# Patient Record
(demographics unavailable — no encounter records)

---

## 2024-12-10 NOTE — REVIEW OF SYSTEMS
Bedside shift change report given to Suni RN (oncoming nurse) by Oralia Livingston RN (offgoing nurse). Report included the following information SBAR, Kardex, Intake/Output, MAR, Recent Results and Cardiac Rhythm SR HR 70. [NI] : Genitourinary  [Nl] : Endocrine [Frequent URIs] : frequent upper respiratory infections [Wheezing] : wheezing [Cough] : cough

## 2024-12-11 NOTE — DATA REVIEWED
[FreeTextEntry1] : I personally reviewed chart documentation - images (pertinent findings included into my note), including: - Dated 2/6/2024 from Dr. Erik Quiñones. - No images to review.

## 2024-12-11 NOTE — HISTORY OF PRESENT ILLNESS
[FreeTextEntry1] : HENRIK is a 3 year old with mild persistent asthma, allergies +Tree +grass +DM +Cats/dogs and food allergies (+cashew / walnut / pecan). ENT: recurrent AOM, previously seen by ENT, no ear tubes.  CLINIC VISIT 2024 - Medications: Budesonide 0.25 mg 1 vial BID -increased recently due to ongoing cough. Zyrtec PRN -not used. - ENT evaluation for recurrent AOMs -not seen in over 1 year. No ear infections recently. - Recent symptoms: cough x 2 months. PCP 1.5 months ago evaluated, lung exam nl. - Recent Albuterol: no, used x 1 seemed to have helped cough. - Recent Oral steroids or ER visits: no.  VISIT 2024 - Started Budesonide 0.25, using consistently (good technique). - Currently, doing well without wheezing and with now resolved nighttime cough. - Allergy skin test +Tree +grass +DM +Cats/dogs. Recommend Zyrtec PRN. - OCS: none since early .  INITIAL HISTORY  Wheezing with recent viral infections. ER visit recently Dec 2023 - sent by PMD after Nebulization. Nighttime cough reported frequently.  RESPIRATORY HISTORY - Symptoms with colds / exertion: - ER visits: x 1 in Dec 2023 Nebulized and Oral steroids. - Hospitalizations: no - ENT-related issues (snoring / AOM): +recurrent AOM. No snoring. - Allergies: no environmental allergies. - Oral steroids: x 2 OCS in lifetime. - ASTHMA risk factors: +PGM has asthma. No eczema (sometimes red on face). - Covid info: infection x 1  -mild cold. Not vaccinated.   - Exposures (smoke, pets): hamster at home. - Delayed vaccinations: no, Flu shot received -unsure. - Birth info: normal  course.

## 2024-12-11 NOTE — CONSULT LETTER
[Dear  ___] : Dear  [unfilled], [Consult Letter:] : I had the pleasure of evaluating your patient, [unfilled]. [Please see my note below.] : Please see my note below. [Consult Closing:] : Thank you very much for allowing me to participate in the care of this patient.  If you have any questions, please do not hesitate to contact me. [Sincerely,] : Sincerely, [FreeTextEntry3] : Abdelrahman Herrera MD Attending Physician, Division of Pediatric Pulmonology.

## 2024-12-11 NOTE — PHYSICAL EXAM
[Well Nourished] : well nourished [Well Developed] : well developed [Alert] : ~L alert [Active] : active [Normal Breathing Pattern] : normal breathing pattern [No Respiratory Distress] : no respiratory distress [No Allergic Shiners] : no allergic shiners [No Drainage] : no drainage [No Conjunctivitis] : no conjunctivitis [No Oral Pallor] : no oral pallor [No Oral Cyanosis] : no oral cyanosis [No Postnasal Drip] : no postnasal drip [No Stridor] : no stridor [Absence Of Retractions] : absence of retractions [Symmetric] : symmetric [Good Expansion] : good expansion [No Acc Muscle Use] : no accessory muscle use [Equal Breath Sounds] : equal breath sounds bilaterally [No Crackles] : no crackles [No Rhonchi] : no rhonchi [No Wheezing] : no wheezing [Normal Sinus Rhythm] : normal sinus rhythm [No Heart Murmur] : no heart murmur [Soft, Non-Tender] : soft, non-tender [No Hepatosplenomegaly] : no hepatosplenomegaly [Non Distended] : was not ~L distended [Abdomen Mass (___ Cm)] : no abdominal mass palpated [Full ROM] : full range of motion [No Clubbing] : no clubbing [Capillary Refill < 2 secs] : capillary refill less than two seconds [No Cyanosis] : no cyanosis [No Petechiae] : no petechiae [No Kyphoscoliosis] : no kyphoscoliosis [No Contractures] : no contractures [Alert and  Oriented] : alert and oriented [No Abnormal Focal Findings] : no abnormal focal findings [Normal Muscle Tone And Reflexes] : normal muscle tone and reflexes [No Birth Marks] : no birth marks [No Rashes] : no rashes [No Skin Lesions] : no skin lesions [Tympanic Membranes Clear] : tympanic membranes were clear [FreeTextEntry3] : +dull TM bilateral. [de-identified] : +shotty anterior cervical line LNs (>Right side). [FreeTextEntry7] : +reduced air exchange throughout, borderline wheezing left lung.

## 2024-12-11 NOTE — PHYSICAL EXAM
[Well Nourished] : well nourished [Well Developed] : well developed [Alert] : ~L alert [Active] : active [Normal Breathing Pattern] : normal breathing pattern [No Respiratory Distress] : no respiratory distress [No Allergic Shiners] : no allergic shiners [No Drainage] : no drainage [No Conjunctivitis] : no conjunctivitis [No Oral Pallor] : no oral pallor [No Oral Cyanosis] : no oral cyanosis [No Postnasal Drip] : no postnasal drip [No Stridor] : no stridor [Absence Of Retractions] : absence of retractions [Symmetric] : symmetric [Good Expansion] : good expansion [No Acc Muscle Use] : no accessory muscle use [Equal Breath Sounds] : equal breath sounds bilaterally [No Crackles] : no crackles [No Rhonchi] : no rhonchi [No Wheezing] : no wheezing [Normal Sinus Rhythm] : normal sinus rhythm [No Heart Murmur] : no heart murmur [Soft, Non-Tender] : soft, non-tender [No Hepatosplenomegaly] : no hepatosplenomegaly [Non Distended] : was not ~L distended [Abdomen Mass (___ Cm)] : no abdominal mass palpated [Full ROM] : full range of motion [No Clubbing] : no clubbing [Capillary Refill < 2 secs] : capillary refill less than two seconds [No Cyanosis] : no cyanosis [No Petechiae] : no petechiae [No Kyphoscoliosis] : no kyphoscoliosis [No Contractures] : no contractures [Alert and  Oriented] : alert and oriented [No Abnormal Focal Findings] : no abnormal focal findings [Normal Muscle Tone And Reflexes] : normal muscle tone and reflexes [No Birth Marks] : no birth marks [No Rashes] : no rashes [No Skin Lesions] : no skin lesions [Tympanic Membranes Clear] : tympanic membranes were clear [FreeTextEntry3] : +dull TM bilateral. [de-identified] : +shotty anterior cervical line LNs (>Right side). [FreeTextEntry7] : +reduced air exchange throughout, borderline wheezing left lung.

## 2024-12-11 NOTE — HISTORY OF PRESENT ILLNESS
no [FreeTextEntry1] : HENRIK is a 3 year old with mild persistent asthma, allergies +Tree +grass +DM +Cats/dogs and food allergies (+cashew / walnut / pecan). ENT: recurrent AOM, previously seen by ENT, no ear tubes.  CLINIC VISIT 2024 - Medications: Budesonide 0.25 mg 1 vial BID -increased recently due to ongoing cough. Zyrtec PRN -not used. - ENT evaluation for recurrent AOMs -not seen in over 1 year. No ear infections recently. - Recent symptoms: cough x 2 months. PCP 1.5 months ago evaluated, lung exam nl. - Recent Albuterol: no, used x 1 seemed to have helped cough. - Recent Oral steroids or ER visits: no.  VISIT 2024 - Started Budesonide 0.25, using consistently (good technique). - Currently, doing well without wheezing and with now resolved nighttime cough. - Allergy skin test +Tree +grass +DM +Cats/dogs. Recommend Zyrtec PRN. - OCS: none since early .  INITIAL HISTORY  Wheezing with recent viral infections. ER visit recently Dec 2023 - sent by PMD after Nebulization. Nighttime cough reported frequently.  RESPIRATORY HISTORY - Symptoms with colds / exertion: - ER visits: x 1 in Dec 2023 Nebulized and Oral steroids. - Hospitalizations: no - ENT-related issues (snoring / AOM): +recurrent AOM. No snoring. - Allergies: no environmental allergies. - Oral steroids: x 2 OCS in lifetime. - ASTHMA risk factors: +PGM has asthma. No eczema (sometimes red on face). - Covid info: infection x 1  -mild cold. Not vaccinated.   - Exposures (smoke, pets): hamster at home. - Delayed vaccinations: no, Flu shot received -unsure. - Birth info: normal  course.

## 2024-12-11 NOTE — ASSESSMENT
[FreeTextEntry1] : HENRIK, 3 year old with reactive airway disease (RAD)/Asthma and multiple environmental/food allergies. Asthma with exacerbation symptoms (chronic cough) now. Oral steroids have not been used. Recommend using Albuterol and OCS burst if cough does not improve over the next 2-3 days with Albuterol. Should continue with Budesonide BID to ensure appropriate asthma control and avoid asthma complications. Confirmed proper inhaler technique to be used with Albuterol.  Atopy with multiple allergies. Given suspected atopy (multiple environmental and food allergies), I would use ICS BID during winter. Also, low threshold for OCS use. Lung exam today with slightly reduction of air exchange suggesting persistent mild airway inflammation.  Environmental allergies. SPT revealed +tree +grass +DM and +dog/cat allergies. I would have a low threshold for use of antihistamine to control allergies.  Recurrent ear infections. None recently but on exam TM seems dull. Referred to ENT as to evaluate need for specific therapy and/or ear tubes.  Discussed above assessment, management plan and potential medication side effects. Parent agreed with plan. All queries were answered. Evaluation includes normal saturation. Time excludes separately reported services.   Recommend: - Continue with Budesonide 0.25 mg, 1 vial to twice per day throughout the winter. - Albuterol, 2 puffs (or 1 nebulized vial) every 4 - 6 hours, "as needed" for cough, shortness of breath or wheeze (rescue inhaler). - Oral steroids (prednisolone) if cough does not improve with Albuterol. - Annual influenza vaccination. - Make ENT follow-up to check ears. - Zyrtec as needed. - Follow-up in 4 months.

## 2025-04-09 NOTE — PHYSICAL EXAM
[Well Nourished] : well nourished [Well Developed] : well developed [Alert] : ~L alert [Active] : active [Normal Breathing Pattern] : normal breathing pattern [No Respiratory Distress] : no respiratory distress [No Allergic Shiners] : no allergic shiners [No Drainage] : no drainage [No Conjunctivitis] : no conjunctivitis [No Oral Pallor] : no oral pallor [No Oral Cyanosis] : no oral cyanosis [No Postnasal Drip] : no postnasal drip [No Stridor] : no stridor [Absence Of Retractions] : absence of retractions [Symmetric] : symmetric [Good Expansion] : good expansion [No Acc Muscle Use] : no accessory muscle use [Equal Breath Sounds] : equal breath sounds bilaterally [No Crackles] : no crackles [No Rhonchi] : no rhonchi [Normal Sinus Rhythm] : normal sinus rhythm [No Heart Murmur] : no heart murmur [Soft, Non-Tender] : soft, non-tender [No Hepatosplenomegaly] : no hepatosplenomegaly [Non Distended] : was not ~L distended [Abdomen Mass (___ Cm)] : no abdominal mass palpated [Full ROM] : full range of motion [No Clubbing] : no clubbing [Capillary Refill < 2 secs] : capillary refill less than two seconds [No Cyanosis] : no cyanosis [No Petechiae] : no petechiae [No Kyphoscoliosis] : no kyphoscoliosis [No Contractures] : no contractures [Alert and  Oriented] : alert and oriented [No Abnormal Focal Findings] : no abnormal focal findings [Normal Muscle Tone And Reflexes] : normal muscle tone and reflexes [No Birth Marks] : no birth marks [No Rashes] : no rashes [No Skin Lesions] : no skin lesions [Good aeration to bases] : good aeration to bases [No Wheezing] : no wheezing [FreeTextEntry3] : +Right TM dull/bulging. [FreeTextEntry7] : +hypo-aeration ---- resolved ----

## 2025-04-09 NOTE — REVIEW OF SYSTEMS
[NI] : Genitourinary  [Nl] : Endocrine [Frequent URIs] : frequent upper respiratory infections [Wheezing] : wheezing [Cough] : cough [Recurrent Ear Infections] : recurrent ear infections

## 2025-04-09 NOTE — HISTORY OF PRESENT ILLNESS
[FreeTextEntry1] : ISRAEL is a 4-year-old with mild persistent asthma. ALLERGY: +Tree +grass +DM +Cats/dogs. Food allergies (+cashew / walnut / pecan). ENT: recurrent AOM, no ear tubes (outside ENT).  CLINIC VISIT 2025 - Using Budesonide 0.25mg BID CONSISTENTL. No baseline symptoms - No frequent use of Albuterol. Currently with mild cough/URI, well managed with Albuterol. - OCS sent Dec 2024 were not used as [place] cough improved. - Seen by ENT; no issues with ear at that time but now with Right AOM on Cefdinir. - No recent ER visits.  CLINIC VISIT 2024 - Medications: Budesonide 0.25 mg 1 vial BID -increased recently due to ongoing cough. Zyrtec PRN -not used. - ENT evaluation for recurrent AOMs -not seen in over 1 year. No ear infections recently. - Recent symptoms: cough x 2 months. PCP 1.5 months ago evaluated, lung exam nl. - Recent Albuterol: no, used x 1 seemed to have helped cough. - Recent Oral steroids or ER visits: no.  VISIT 2024 - Started Budesonide 0.25, using consistently (good technique). - Currently, doing well without wheezing and with now resolved nighttime cough. - Allergy skin test +Tree +grass +DM +Cats/dogs. Recommend Zyrtec PRN. - OCS: none since early .  INITIAL HISTORY  Wheezing with recent viral infections. ER visit recently Dec 2023 - sent by PMD after Nebulization. Nighttime cough reported frequently.  RESPIRATORY HISTORY - Symptoms with colds / exertion: - ER visits: x 1 in Dec 2023 Nebulized and Oral steroids. - Hospitalizations: no - ENT-related issues (snoring / AOM): +recurrent AOM. No snoring. - Allergies: no environmental allergies. - Oral steroids: x 2 OCS in lifetime. - ASTHMA risk factors: +PGM has asthma. No eczema (sometimes red on face). - Covid info: infection x 1  -mild cold. Not vaccinated.   - Exposures (smoke, pets): hamster at home. - Delayed vaccinations: no, Flu shot received -unsure. - Birth info: normal  course.  Asthma Control Test (ACT):  1. Asthma today?                  3-very good, 2-good, 1-bad, 0-very bad.                         Score: 2 2. Symptoms with activity?   3-very good, 2-sometimes, 1-frequently, 0-all the time.    Score: 3 3. How is cough?                  3-none, 2-sometimes, 1 -most time, 0-all the time.             Score: 2 4. Nighttime awakening?       3-none, 2-sometimes, 1-most time, 0-all the time.              Score: 3 5. Symptoms presented        5) none, 4) 1 - 3 times, 3) 4 - 10 times, 2) 11 - 18 time, 1) 19 - 24 times, 0) everyday. ---Daytime stx?   Score: 4 ---Wheezing?      Score: 5 ---Wake up?        Score: 5  Total score: 24

## 2025-04-09 NOTE — ASSESSMENT
[FreeTextEntry1] : HENRIK, 4 year old with reactive airway disease (RAD)/Asthma and multiple environmental/food allergies. Patient's interval history (no recurring asthma symptoms), ACT score (>19) and physical exam (unremarkable lung exam) support well controlled asthma with Budesonide 0.25mg use. Will change controller medication to Fluticasone Propionate 44 since now tolerating mask with spacer. Controlled asthma will reduce risk of significant respiratory symptoms and/or life-threatening exacerbations. Recommend continuing current asthma management. The lowest dose of therapy will be used to decrease side effect risk; advised to reduce Fluticasone Propionate 44 to one puff BID once current asthma exacerbation is over.  Atopy / multiple allergies. Multiple environmental and food allergies. Low threshold for OCS use. Encourage the use of antihistamines as needed. SPT revealed +tree +grass +DM and +dog/cat allergies.  Recurrent ear infections. Dull TM on previous exam resolved on follow-up with ENT. Despite of this, has Right AOM being treated with Cefdinir.  Discussed above assessment, management plan and potential medication side effects. Parent agreed with plan. All queries were answered. Evaluation includes normal saturation. Time excludes separately reported services.   Recommend: - Switch to Fluticasone Propionate 44 mcg 2 puffs twice daily ---- Reduce dose to 1 puff twice daily couple of weeks after cough resolves --- ---- Alternative: Budesonide 0.25 mg, 1 vial twice daily --- - Albuterol, 2 puffs (or 1 nebulized vial) every 4 - 6 hours, "as needed" for cough, shortness of breath or wheeze (rescue inhaler). - Annual influenza vaccination. - Zyrtec as needed. - Finish antibiotics (Cefdinir) for Right ear infection. - Monitor frequency of ear infections. f/u with ENT if continue to recur. - Follow-up in 3-4 months.